# Patient Record
Sex: FEMALE | Race: WHITE | NOT HISPANIC OR LATINO | Employment: UNEMPLOYED | URBAN - METROPOLITAN AREA
[De-identification: names, ages, dates, MRNs, and addresses within clinical notes are randomized per-mention and may not be internally consistent; named-entity substitution may affect disease eponyms.]

---

## 2018-10-21 ENCOUNTER — HOSPITAL ENCOUNTER (EMERGENCY)
Facility: HOSPITAL | Age: 48
Discharge: HOME | End: 2018-10-21
Attending: EMERGENCY MEDICINE
Payer: COMMERCIAL

## 2018-10-21 VITALS
HEART RATE: 100 BPM | RESPIRATION RATE: 18 BRPM | BODY MASS INDEX: 26.43 KG/M2 | SYSTOLIC BLOOD PRESSURE: 126 MMHG | HEIGHT: 61 IN | TEMPERATURE: 98.1 F | OXYGEN SATURATION: 98 % | WEIGHT: 140 LBS | DIASTOLIC BLOOD PRESSURE: 64 MMHG

## 2018-10-21 DIAGNOSIS — S90.521A: Primary | ICD-10-CM

## 2018-10-21 PROCEDURE — 99283 EMERGENCY DEPT VISIT LOW MDM: CPT

## 2018-10-21 RX ORDER — CIPROFLOXACIN 250 MG/1
250 TABLET, FILM COATED ORAL EVERY 12 HOURS
Qty: 6 TABLET | Refills: 0 | Status: SHIPPED | OUTPATIENT
Start: 2018-10-21 | End: 2018-10-24

## 2018-10-21 RX ORDER — CIPROFLOXACIN 500 MG/1
500 TABLET, FILM COATED ORAL ONCE
Status: COMPLETED | OUTPATIENT
Start: 2018-10-21 | End: 2018-10-21

## 2018-10-21 RX ADMIN — CIPROFLOXACIN HYDROCHLORIDE 500 MG: 500 TABLET, FILM COATED ORAL at 22:16

## 2018-10-22 NOTE — ED PROVIDER NOTES
History  Chief Complaint   Patient presents with    Blister     Patient states has a blister on her right ankle that was "from a boot" that she noticed on Friday  came to ER tonight because she started with a "pink streak going up the inside of my leg "  Some reddness and warmth around blister area on right heel  Also has open blister on left heel, but no pain and no streaking up leg     35YEAR-OLD FEMALE WITH NO HISTORY OF DISEASE PRESENTS TO THE EMERGENCY DEPARTMENT WITH A BLISTER TO RIGHT ANKLE FOR THE PAST 2 DAYS  THE PATIENT ATTRIBUTES THE BLISTER TO WAY OUT RUB FROM HER BOOT, A SIMILAR OCCURRENCE HAVING HAPPEN ON HER LEFT ANKLE  THE CLEANING HER ANKLE WITH FULL STRENGTH HYDROGEN PEROXIDE THE PATIENT NOTICED REDNESS IN AND AROUND THE LESION AND SOME REPORTED STREAKING PROXIMALLY  SHE DENIES ANY PAIN NO ITCHING NO DISCHARGE FROM THE WOUND SITE ITSELF  SHE REPORTS NO FEVER OR CHILLS  None       History reviewed  No pertinent past medical history  History reviewed  No pertinent surgical history  History reviewed  No pertinent family history  I have reviewed and agree with the history as documented  Social History   Substance Use Topics    Smoking status: Never Smoker    Smokeless tobacco: Never Used    Alcohol use No        Review of Systems   Constitutional: Negative for chills and fever  HENT: Negative for ear pain, rhinorrhea and sore throat  Eyes: Negative for pain, redness and visual disturbance  Respiratory: Negative for cough and shortness of breath  Cardiovascular: Negative for chest pain and leg swelling  Gastrointestinal: Negative for abdominal pain, diarrhea, nausea and vomiting  Genitourinary: Negative for dysuria, flank pain, frequency and urgency  Musculoskeletal: Negative for back pain, myalgias and neck pain  Skin: Negative for rash  Neurological: Negative for dizziness, weakness, light-headedness and headaches  Hematological: Negative  Psychiatric/Behavioral: Negative for agitation, confusion and suicidal ideas  The patient is not nervous/anxious  All other systems reviewed and are negative  Physical Exam  Physical Exam   Constitutional: She is oriented to person, place, and time  She appears well-developed and well-nourished  HENT:   Nose: Nose normal    Mouth/Throat: Oropharynx is clear and moist  No oropharyngeal exudate  Eyes: Pupils are equal, round, and reactive to light  Conjunctivae and EOM are normal  No scleral icterus  Neck: Normal range of motion  Neck supple  No JVD present  No tracheal deviation present  Cardiovascular: Normal rate, regular rhythm and normal heart sounds  No murmur heard  Pulmonary/Chest: Effort normal and breath sounds normal  No respiratory distress  She has no wheezes  She has no rales  Abdominal: Soft  Bowel sounds are normal  There is no tenderness  There is no guarding  Musculoskeletal: Normal range of motion  She exhibits no edema or tenderness  Neurological: She is alert and oriented to person, place, and time  No cranial nerve deficit or sensory deficit  She exhibits normal muscle tone  5/5 motor, nl sens   Skin: Skin is warm and dry  THERE IS AN APPROXIMATE 1 CM RUPTURED BLISTER TO THE RIGHT MEDIAL ANKLE WITH A LINEAR APPENDAGE THAT EXTENDS POSTERIOR WERE  THERE ARE NON VIABLE FLAPS THE ASPECTS OF THE BLISTER  THERE IS ASSOCIATED ERYTHEMA WITH SOME PROXIMAL ERYTHEMA BUT NO DISTINCT STREAKING  THIS PATCH OF ERYTHEMA IS UNREMARKABLE IN TERMS OF WARMTH TENDERNESS OR EDEMA  THERE IS NO CELLULITIC COMPONENT  THERE IS NO WOUND SEPSIS  SEE PROCEDURE NOTE FOR FURTHER INFORMATION  Psychiatric: She has a normal mood and affect  Her behavior is normal    Nursing note and vitals reviewed        Vital Signs  ED Triage Vitals [10/21/18 2145]   Temperature Pulse Respirations Blood Pressure SpO2   98 1 °F (36 7 °C) 100 18 126/64 98 %      Temp Source Heart Rate Source Patient Position - Orthostatic VS BP Location FiO2 (%)   Temporal Monitor Sitting Left arm --      Pain Score       No Pain           Vitals:    10/21/18 2145   BP: 126/64   Pulse: 100   Patient Position - Orthostatic VS: Sitting       Visual Acuity      ED Medications  Medications   ciprofloxacin (CIPRO) tablet 500 mg (not administered)       Diagnostic Studies  Results Reviewed     None                 No orders to display              Procedures  Lac Repair  Date/Time: 10/21/2018 10:17 PM  Performed by: Carlo Nielson  Authorized by: Rosalia SMALL   Consent: Verbal consent obtained  Body area: lower extremity  Location details: right ankle  Foreign bodies: no foreign bodies  Tendon involvement: none  Nerve involvement: none  Vascular damage: no    Sedation:  Patient sedated: no      Procedure Details:  Irrigation solution: saline  Amount of cleaning: standard  Debridement: minimal  Degree of undermining: none  Patient tolerance: Patient tolerated the procedure well with no immediate complications  Comments: THE WOUND IS DEBRIDED OF ITS NONVIABLE FLAPS  THE EXPOSED WOUND REVEALS NO DISCHARGE NOR SEPTIC COMPONENT  THERE IS EARLY GRANULATION TO MARGINS  THERE IS NO MASS ASSOCIATED TENDERNESS NO SIGNIFICANT EDEMA NOR IS THERE ERYTHEMA OR WARMTH TO SUGGEST A CELLULITIC COMPONENT  Phone Contacts  ED Phone Contact    ED Course                               MDM  CritCare Time    Disposition  Final diagnoses:   Blister of right ankle     Time reflects when diagnosis was documented in both MDM as applicable and the Disposition within this note     Time User Action Codes Description Comment    10/21/2018 10:08 PM Epifanio Gutierrez [D51 [id number]] Blister of right ankle       ED Disposition     ED Disposition Condition Comment    Discharge  Mayra Lainez discharge to home/self care      Condition at discharge: Stable        Follow-up Information     Follow up With Specialties Details Why Contact [facility]    HEALTHCARE PROVIDER OF CHOICE  Call            Patient's Medications   Discharge Prescriptions    CIPROFLOXACIN (CIPRO) 250 MG TABLET    Take 1 tablet (250 mg total) by mouth every 12 (twelve) hours for 3 days       Start Date: 10/21/2018End Date: 10/24/2018       Order Dose: 250 mg       Quantity: 6 tablet    Refills: 0     No discharge procedures on file      ED Provider  Electronically Signed by           Darryl Weiss MD  10/21/18 9755

## 2018-10-22 NOTE — DISCHARGE INSTRUCTIONS
Blister, Ambulatory Care   GENERAL INFORMATION:   A blister  is a fluid-filled pocket on the surface of your skin  A blister forms when hot or moist skin rubs or pushes against an object repeatedly  Blisters mostly occur on body parts that are used often, or move freely, such as your hands or feet  Pain can be mild or severe, depending on the size of your blister  Contact your healthcare provider for the following symptoms:   · Increased pain, redness, and swelling    · A bad-smelling fluid coming from your blister    · The blister is not healing within the amount of time your healthcare provider says it should    · Fever, chills, or body aches  Care for your blister:   · Do not pop your blister or tear the skin on it  This could cause infection and slow the healing process  · Wash your hands before you care for your blister  to help prevent infection  Use soap and water  Wash your hands often, and after you use the bathroom, change a child's diapers, or sneeze  · Clean your blister as directed  Carefully remove your bandage  If the bandage sticks to your blister, pour saline on it  This will help the bandage come off more easily and prevent further skin damage  Wash the blister area with soap or saline and water  Gently pat the area dry  · Look for signs of infection  Check the area around your blister for swelling, redness, or pain  · Apply clean bandages as directed  Change your bandages when they get wet, dirty, or soaked with fluid  Your healthcare provider may tell you to use certain moist bandages or hydrogels to prevent them from sticking to your wound  You may need a bandage that absorbs well if your blister has excess fluid  You may be told to wear a second bandage for extra protection  · Take medicine for pain as directed  Prevent another blister:   · Wear synthetic clothing    Acrylic-blend, and moisture-wicking fabrics will help prevent moisture buildup and friction on your skin  These fabrics will also prevent your blister from sticking to them  · Use lubricating ointment  Emollient or silicone ointments may prevent blisters during activities that last 1 hour or less  Do not use them for activities that will last longer than 1 hour  · Wear antiperspirant on your feet as directed  Reduced moisture on your feet will help prevent blisters  · Wear shoes that fit well  Shoes that rub your feet may cause or worsen blisters  Wear cushioned insoles as directed  Follow up with your healthcare provider as directed: You may need to return to have your blister drained if it is large  Write down your questions so you remember to ask them during your visits  CARE AGREEMENT:   You have the right to help plan your care  Learn about your health condition and how it may be treated  Discuss treatment options with your caregivers to decide what care you want to receive  You always have the right to refuse treatment  The above information is an  only  It is not intended as medical advice for individual conditions or treatments  Talk to your doctor, nurse or pharmacist before following any medical regimen to see if it is safe and effective for you  © 2014 [address] is for End User's use only and may not be sold, redistributed or otherwise used for commercial purposes  All illustrations and images included in CareNotes® are the copyrighted property of A D A M [employer]  or [employer]

## 2019-12-17 ENCOUNTER — TRANSCRIBE ORDERS (OUTPATIENT)
Dept: ADMINISTRATIVE | Facility: HOSPITAL | Age: 49
End: 2019-12-17

## 2019-12-17 ENCOUNTER — HOSPITAL ENCOUNTER (OUTPATIENT)
Dept: NON INVASIVE DIAGNOSTICS | Facility: HOSPITAL | Age: 49
Discharge: HOME | End: 2019-12-17
Payer: COMMERCIAL

## 2019-12-17 ENCOUNTER — HOSPITAL ENCOUNTER (OUTPATIENT)
Dept: RADIOLOGY | Facility: HOSPITAL | Age: 49
Discharge: HOME | End: 2019-12-17
Payer: COMMERCIAL

## 2019-12-17 DIAGNOSIS — Z01.818 PRE-OPERATIVE CLEARANCE: ICD-10-CM

## 2019-12-17 DIAGNOSIS — Z01.818 PRE-OPERATIVE CLEARANCE: Primary | ICD-10-CM

## 2019-12-17 LAB
ATRIAL RATE: 96 BPM
P AXIS: 73 DEGREES
PR INTERVAL: 140 MS
QRS AXIS: 56 DEGREES
QRSD INTERVAL: 80 MS
QT INTERVAL: 354 MS
QTC INTERVAL: 447 MS
T WAVE AXIS: 51 DEGREES
VENTRICULAR RATE: 96 BPM

## 2019-12-17 PROCEDURE — 71046 X-RAY EXAM CHEST 2 VIEWS: CPT

## 2019-12-17 PROCEDURE — 93010 ELECTROCARDIOGRAM REPORT: CPT | Performed by: INTERNAL MEDICINE

## 2019-12-17 PROCEDURE — 93005 ELECTROCARDIOGRAM TRACING: CPT

## 2021-09-17 ENCOUNTER — NURSE TRIAGE (OUTPATIENT)
Dept: OTHER | Facility: OTHER | Age: 51
End: 2021-09-17

## 2021-09-17 DIAGNOSIS — Z20.822 EXPOSURE TO COVID-19 VIRUS: Primary | ICD-10-CM

## 2021-09-17 NOTE — TELEPHONE ENCOUNTER
Reason for Disposition   [1] CLOSE CONTACT COVID-19 EXPOSURE within last 14 days AND [2] NO symptoms    Answer Assessment - Initial Assessment Questions  Were you within 6 feet or less, for up to 15 minutes or more with a person that has a confirmed COVID-19 test?   Yes     What was the date of your exposure?    9/13    Are you experiencing any symptoms attributed to the virus?  (Assess for SOB, cough, fever, difficulty breathing)   Runny nose    HIGH RISK: Do you have any history heart or lung conditions, weakened immune system, diabetes, Asthma, CHF, HIV, COPD, Chemo, renal failure, sickle cell, etc?   Denies    Protocols used: CORONAVIRUS (COVID-19) EXPOSURE-ADULT-AH

## 2021-09-18 PROCEDURE — U0003 INFECTIOUS AGENT DETECTION BY NUCLEIC ACID (DNA OR RNA); SEVERE ACUTE RESPIRATORY SYNDROME CORONAVIRUS 2 (SARS-COV-2) (CORONAVIRUS DISEASE [COVID-19]), AMPLIFIED PROBE TECHNIQUE, MAKING USE OF HIGH THROUGHPUT TECHNOLOGIES AS DESCRIBED BY CMS-2020-01-R: HCPCS | Performed by: FAMILY MEDICINE

## 2021-09-18 PROCEDURE — U0005 INFEC AGEN DETEC AMPLI PROBE: HCPCS | Performed by: FAMILY MEDICINE

## 2021-09-21 ENCOUNTER — TELEPHONE (OUTPATIENT)
Dept: OTHER | Facility: OTHER | Age: 51
End: 2021-09-21

## 2021-09-21 NOTE — TELEPHONE ENCOUNTER
Your test for the novel coronavirus, also known as COVID-19, was positive  The sample showed that the virus was present  Positive COVID-19 test results are reportable to the [place] Department of Health  You may receive a call from trained public health staff to conduct an interview  It is important to answer their call  They will ask you to verify who you are  During the call they will ask you about what symptoms you have, what you did before you got sick, and who you were close to while sick  The health department does this to make sure everyone stays healthy and to reduce the spread of the virus  If you would like to verify if the caller does in fact work in contact tracing, call the [address] [place] [address] at [employer] ([phone])  For additional information, please visit the [employer] website: www health [place] gov     If you have any additional questions, we can schedule a virtual visit for you with a provider or call the [facility] hotline [phone], option 7, for care advice    For additional information, please visit the Coronavirus FAQ on the [facility] home page ([employer]  org)  Patient has a PCP for follow up care

## 2022-03-27 ENCOUNTER — HOSPITAL ENCOUNTER (EMERGENCY)
Facility: HOSPITAL | Age: 52
Discharge: HOME | End: 2022-03-27
Attending: EMERGENCY MEDICINE
Payer: COMMERCIAL

## 2022-03-27 VITALS
HEIGHT: 61 IN | DIASTOLIC BLOOD PRESSURE: 94 MMHG | WEIGHT: 145 LBS | RESPIRATION RATE: 17 BRPM | HEART RATE: 102 BPM | BODY MASS INDEX: 27.38 KG/M2 | SYSTOLIC BLOOD PRESSURE: 141 MMHG | OXYGEN SATURATION: 99 % | TEMPERATURE: 97.6 F

## 2022-03-27 DIAGNOSIS — Z71.1 WORRIED WELL: Primary | ICD-10-CM

## 2022-03-27 PROCEDURE — 99281 EMR DPT VST MAYX REQ PHY/QHP: CPT | Performed by: PHYSICIAN ASSISTANT

## 2022-03-27 PROCEDURE — 99283 EMERGENCY DEPT VISIT LOW MDM: CPT

## 2022-03-27 NOTE — ED PROVIDER NOTES
History  Chief Complaint   Patient presents with    Medical Problem     Patient reports that she woke up this morning and noticed her vein in her forhead was bigger than normal       34-year-old female history of anxiety presents complaining of a dilated vein in her forehead  Patient reports that she had laser treatments for rosacea on her face a few days ago  This is the 1st time she has ever received those treatments  Patient reports that she was reading a lot on the Internet and is concerned that the dilated vein could potentially be a blood clot or an aneurysm  Patient denies any pain over the affected area  She denies any headache, visual changes including diplopia, jaw claudication, chest pain, nausea, sweats, shortness of breath  She denies any recent trauma  Has not taken anything for her symptoms  States that she is under lot of stress  None       History reviewed  No pertinent past medical history  History reviewed  No pertinent surgical history  History reviewed  No pertinent family history  I have reviewed and agree with the history as documented  E-Cigarette/Vaping     E-Cigarette/Vaping Substances     Social History     Tobacco Use    Smoking status: Never Smoker    Smokeless tobacco: Never Used   Substance Use Topics    Alcohol use: No    Drug use: No       Review of Systems   Constitutional: Negative for chills, fatigue and fever  HENT: Negative for ear pain and sore throat  Eyes: Negative for pain  Respiratory: Negative for cough, shortness of breath and wheezing  Cardiovascular: Negative for chest pain, palpitations and leg swelling  Gastrointestinal: Negative for abdominal pain, constipation, diarrhea, nausea and vomiting  Endocrine: Negative for polyuria  Genitourinary: Negative for dysuria and pelvic pain  Musculoskeletal: Negative for arthralgias, myalgias, neck pain and neck stiffness  Skin: Negative for rash     Neurological: Negative for dizziness, syncope, light-headedness and headaches  Psychiatric/Behavioral: The patient is nervous/anxious  All other systems reviewed and are negative  Physical Exam  Physical Exam  Constitutional:       General: She is not in acute distress  Appearance: Normal appearance  She is well-developed  HENT:      Head: Normocephalic and atraumatic  Comments: No temporal tenderness  There are 2 dilated veins over patient's temporal region  No overlying skin changes  No erythema, edema  Right Ear: External ear normal       Left Ear: External ear normal       Mouth/Throat:      Pharynx: No oropharyngeal exudate  Eyes:      Extraocular Movements: Extraocular movements intact  Pupils: Pupils are equal, round, and reactive to light  Cardiovascular:      Rate and Rhythm: Normal rate and regular rhythm  Heart sounds: Normal heart sounds  Pulmonary:      Effort: Pulmonary effort is normal       Breath sounds: Normal breath sounds  Abdominal:      General: Bowel sounds are normal       Palpations: Abdomen is soft  Tenderness: There is no abdominal tenderness  Musculoskeletal:         General: Normal range of motion  Cervical back: Normal range of motion  Skin:     General: Skin is warm  Capillary Refill: Capillary refill takes less than 2 seconds  Neurological:      General: No focal deficit present  Mental Status: She is alert and oriented to person, place, and time  Cranial Nerves: No cranial nerve deficit     Psychiatric:      Comments: Anxious, rapid and pressured speech         Vital Signs  ED Triage Vitals [03/27/22 1012]   Temperature Pulse Respirations Blood Pressure SpO2   97 6 °F (36 4 °C) 102 17 141/94 99 %      Temp Source Heart Rate Source Patient Position - Orthostatic VS BP Location FiO2 (%)   Oral Monitor Sitting Left arm --      Pain Score       No Pain           Vitals:    03/27/22 1012   BP: 141/94   Pulse: 102   Patient Position - Orthostatic VS: Sitting         Visual Acuity      ED Medications  Medications - No data to display    Diagnostic Studies  Results Reviewed     None                 No orders to display              Procedures  Procedures         ED Course                               SBIRT 22yo+      Most Recent Value   SBIRT (22 yo +)    In order to provide better care to our patients, we are screening all of our patients for alcohol and drug use  Would it be okay to ask you these screening questions? Yes Filed at: 03/27/2022 1015   Initial Alcohol Screen: US AUDIT-C     1  How often do you have a drink containing alcohol? 0 Filed at: 03/27/2022 1015   2  How many drinks containing alcohol do you have on a typical day you are drinking? 0 Filed at: 03/27/2022 1015   3a  Male UNDER 65: How often do you have five or more drinks on one occasion? 0 Filed at: 03/27/2022 1015   3b  FEMALE Any Age, or MALE 65+: How often do you have 4 or more drinks on one occassion? 0 Filed at: 03/27/2022 1015   Audit-C Score 0 Filed at: 03/27/2022 1015   RENE: How many times in the past year have you    Used an illegal drug or used a prescription medication for non-medical reasons? Never Filed at: 03/27/2022 1015                    MDM  Number of Diagnoses or Management Options  Worried well  Diagnosis management comments: Patient presented with concerns of a dilated vein over her left temporal region  Patient recently had laser treatment on her face for rosacea  Suspect this is likely due to her recent laser treatment  Recommend warm compresses  No overlying skin changes  No tenderness  Doubt superficial thrombophlebitis but patient informed that she may take NSAIDs if she develops any pain  Recommend patient call the doctor that performed the laser treatments  Patient has no signs or symptoms of temporal arteritis  No headache, patient is completely neurologically intact  Doubt CT would     Bedside ultrasound demonstrates that veins are collapsible in this provided patient with significant reassurance  Return precautions were advised, all questions were answered and patient was agreeable to plan and very thankful for care  Disposition  Final diagnoses:   Worried well     Time reflects when diagnosis was documented in both MDM as applicable and the Disposition within this note     Time User Action Codes Description Comment    3/27/2022 10:33 AM José Alcantar Add [Z71 1] Worried well       ED Disposition     ED Disposition Condition Date/Time Comment    Discharge Stable Sun Mar 27, 2022 10:33 AM Jacinta Blood discharge to home/self care  Follow-up Information    None         There are no discharge medications for this patient  No discharge procedures on file      PDMP Review     None          ED Provider  Electronically Signed by           Julio Martinez PA-C  03/27/22 3694

## 2022-03-27 NOTE — DISCHARGE INSTRUCTIONS
Apply a warm compress at least once daily  Discuss with the doctor that performed your laser treatments

## 2022-07-23 ENCOUNTER — AMB VIDEO VISIT (OUTPATIENT)
Dept: OTHER | Facility: HOSPITAL | Age: 52
End: 2022-07-23

## 2022-07-23 VITALS — OXYGEN SATURATION: 100 % | HEART RATE: 97 BPM | RESPIRATION RATE: 16 BRPM

## 2022-07-23 DIAGNOSIS — J20.9 ACUTE BRONCHITIS, UNSPECIFIED ORGANISM: Primary | ICD-10-CM

## 2022-07-23 PROCEDURE — ECARE PR SL URGENT CARE VIRTUAL VISIT: Performed by: NURSE PRACTITIONER

## 2022-07-23 RX ORDER — ATORVASTATIN CALCIUM 20 MG/1
20 TABLET, FILM COATED ORAL DAILY
COMMUNITY

## 2022-07-23 RX ORDER — AZITHROMYCIN 250 MG/1
TABLET, FILM COATED ORAL
Qty: 6 TABLET | Refills: 0 | Status: SHIPPED | OUTPATIENT
Start: 2022-07-23 | End: 2022-07-27

## 2022-07-23 RX ORDER — BENZONATATE 100 MG/1
100 CAPSULE ORAL 3 TIMES DAILY PRN
Qty: 9 CAPSULE | Refills: 0 | Status: SHIPPED | OUTPATIENT
Start: 2022-07-23 | End: 2022-07-26

## 2022-07-23 NOTE — CARE ANYWHERE EVISITS
Visit Summary for [facility]   Alfredo Menendez - Gender: Female - Date of Birth: [id number]  Date: [id number] - Duration: 9 minutes  Patient: [facility]   Alfredo Menendez  Provider: Nicoel ARCOS    Patient Contact Information  Address  [address] [place], [place] [address] [address]  [phone]    Visit Topics  Cold [Added By: Self - 2022-07-23]    Triage Questions   What is your current physical address in the event of a medical emergency? Answer []  Are you allergic to any medications? Answer []  Are you now or could you be pregnant? Answer []  Do you have any immune system compromise or chronic lung   disease? Answer []  Do you have any vulnerable family members in the home (infant, pregnant, cancer, elderly)? Answer []     Conversation Transcripts  [0A][0A] [Notification] You are connected with Jie Patel, Urgent Care Specialist [0A][Notification] Barbara Guthrie is located in [place]  [0A][Notification] Barbara Guthrie has shared health history  [place] Loi  [0A]    Diagnosis  Acute bronchitis, unspecified    Procedures  Value: [id number] Code: CPT-4 UNLISTED E&M SERVICE    Medications Prescribed    No prescriptions ordered    Electronically signed by: Jie Anton(NPI [id number])

## 2022-07-23 NOTE — PROGRESS NOTES
Video Visit - Yanira Reveles 45 y o  female MRN: [id number]    REQUIRED DOCUMENTATION:         1  This service was provided via [facility]  2  Provider located at [address] [place] [id number]  3  [facility] provider: JOSSELYN Messina  4  Identify all parties in room with patient during [facility] visit:  patient   5  After connecting through [place], patient was identified by name and date of birth  Patient was then informed that this was a Telemedicine visit and that the exam was being conducted confidentially over secure lines  My office door was closed  No one else was in the room  Patient acknowledged consent and understanding of privacy and security of the Telemedicine visit  I informed the patient that I have reviewed their record in Epic and presented the opportunity for them to ask any questions regarding the visit today  The patient agreed to participate  45year old female here today for video visit  She states she has not been feeling well for about 1 week  Returned for vacation last Sunday  Cough, sore throat, loss of voice  No chest pain or sob  No diarrhea  Slight yellow mucous production with cough  No sinus pressure  She is vaccinated for covid  Covid test negative  Primary complaint is loss of voice  Review of Systems   Constitutional: Negative for activity change, chills and fatigue  HENT: Positive for congestion  Negative for rhinorrhea, sinus pressure, sinus pain and sore throat (loss of voice )  Respiratory: Positive for cough  Negative for chest tightness, shortness of breath and wheezing  Cardiovascular: Negative  Neurological: Negative  Psychiatric/Behavioral: Negative  Vitals:    07/23/22 0901   Pulse: 97   Resp: 16   SpO2: 100%     Physical Exam  Constitutional:       General: She is not in acute distress  Appearance: Normal appearance  She is not ill-appearing or toxic-appearing     HENT:      Head: Normocephalic and atraumatic  Nose: No congestion  Mouth/Throat:      Comments: Posterior pharynx difficult to see, appears clear with no exudate or tonsilar enlargement   Eyes:      Conjunctiva/sclera: Conjunctivae normal    Pulmonary:      Effort: Pulmonary effort is normal  No respiratory distress  Comments: No cough or audible wheezing   Able to speak in full sentences   Neurological:      Mental Status: She is alert and oriented to person, place, and time  Psychiatric:         Mood and Affect: Mood normal          Behavior: Behavior normal          Thought Content: Thought content normal        Diagnoses and all orders for this visit:    Acute bronchitis, unspecified organism  -     azithromycin (ZITHROMAX) 250 mg tablet; 2 tablets on day 1, 1 tablet day 2-5  -     benzonatate (TESSALON PERLES) 100 mg capsule; Take 1 capsule (100 mg total) by mouth 3 (three) times a day as needed for cough for up to 3 days      Patient Instructions   As we discussed, this is most likely viral   Offered prednisone but you declined  Cough medication as needed  As we discussed only start antibiotic if no improvement in 3 days or worsening symptoms  Salt water gargles can be helpful  Warm tea with honey  Tylenol or motrin as needed  Follow up with PCP if no improvement  Go to ER with any worsening symptoms  Follow up with PCP if not improved, if symptoms are worse, go to the ER

## 2022-07-23 NOTE — PATIENT INSTRUCTIONS
As we discussed, this is most likely viral   Offered prednisone but you declined  Cough medication as needed  As we discussed only start antibiotic if no improvement in 3 days or worsening symptoms  Salt water gargles can be helpful  Warm tea with honey  Tylenol or motrin as needed  Follow up with PCP if no improvement  Go to ER with any worsening symptoms

## 2024-02-10 ENCOUNTER — AMB VIDEO VISIT (OUTPATIENT)
Dept: OTHER | Facility: HOSPITAL | Age: 54
End: 2024-02-10

## 2024-02-10 VITALS
WEIGHT: 145 LBS | TEMPERATURE: 99.6 F | HEIGHT: 61 IN | BODY MASS INDEX: 27.38 KG/M2 | HEART RATE: 48 BPM | RESPIRATION RATE: 14 BRPM

## 2024-02-10 DIAGNOSIS — U07.1 COVID: Primary | ICD-10-CM

## 2024-02-10 PROBLEM — E78.49 OTHER HYPERLIPIDEMIA: Status: ACTIVE | Noted: 2024-02-10

## 2024-02-10 PROCEDURE — ECARE PR SL URGENT CARE VIRTUAL VISIT: Performed by: PHYSICIAN ASSISTANT

## 2024-02-10 RX ORDER — BENZONATATE 200 MG/1
200 CAPSULE ORAL 3 TIMES DAILY PRN
Qty: 20 CAPSULE | Refills: 0 | Status: SHIPPED | OUTPATIENT
Start: 2024-02-10

## 2024-02-10 RX ORDER — MULTIVITAMIN
1 CAPSULE ORAL DAILY
COMMUNITY

## 2024-02-10 NOTE — PATIENT INSTRUCTIONS
"[facility] Phone number is [phone] if you need assistance or have further questions  note in \"Letters\" section of [employer] [facility]. Can print if opened from a web browser    Find an outpatient lab:  [email or web address]  Call [phone], option 2 to request a mobile lab visit to your home    Please Be Courteous:  You have COVID-19. Day 0 is your first day of symptoms. Day 1 is the first full day after your symptoms started. You should isolate yourself away from other through day 5 since this is when you are likely most infectious. This means go home and stay home.     In Your Home:  If you live with other people, trying to avoid common spaces and disinfect areas that you come into contact with.  Per the CDC's recommendations: Use a separate bedroom and bathroom if feasible. If If other people in your home are concerned they may have covid, isolation should begin even before seeking testing and before test results become available. All household members should start wearing a mask in the home, particularly in shared spaces where appropriate distancing is not possible.     Take Care of Yourself:  Schedule a virtual visit with your primary care physician as soon as possible. Try to sleep on your stomach as much as possible.  If you have the ability to, take vitamin D3 2000 IU daily, vitamin-C 1000 mg twice a day, a multivitamin daily to help boost your immune system.  You should check your temperature twice day. Go to the emergency department for any severe shortness of breath, chest pain or pulse ox less than 90%.    Isolation: Everyone, regardless of vaccination status:    You may end isolation after day 5 if:  You are fever-free for 24 hours (without the use of fever-reducing medication)    Your symptoms are improving    If you still have fever or your other symptoms have not improved, continue to isolate until they improve.    If you had?moderate illness?(if you experienced shortness of " breath or had difficulty breathing), or?severe illness?(you were hospitalized) due to COVID-19, or you have a weakened immune system, you need to isolate through day 10.    If you had?severe illness?or have a weakened immune system, consult your doctor before ending isolation. Ending isolation without a viral test may not be an option for you.  https://www.cdc.gov/coronavirus/2019-ncov/your-health/isolation.html

## 2024-02-10 NOTE — PROGRESS NOTES
Required Documentation:  Encounter provider Shannon D Severino, PA-C    Provider located at [facility]  VIRTUAL CARE   [address] [id number]    Identify all parties in room with patient during virtual visit:  No one else    The patient was identified by name and date of birth. Tiffanie Longoria was informed that this is a telemedicine visit and that the visit is being conducted through the [employer] platform. She agrees to proceed..  My office door was closed. No one else was in the room.  She acknowledged consent and understanding of privacy and security of the video platform. The patient has agreed to participate and understands they can discontinue the visit at any time.    Verification of patient location:    Patient is located at Home in the following state in which I hold an active license [place]    Patient is aware this is a billable service.     Reason for visit is No chief complaint on file.       Subjective  HPI   Pt reports tested positive for COVID today. Has sx sore throat, congestion, slight fever, slight cough, HA last night. Nothing taken for sx. Reports she is leaving to go to [place] on Thursday. Denies CP or SOB. Vaccinated for COVID. Had COVID not requiring hospitalization. Reports only takes atorvastatin 3 times weekly. Last dose on Wednesday.     No past medical history on file.    No past surgical history on file.     Allergies   Allergen Reactions    Penicillins Hives       Review of Systems   Constitutional:  Positive for fever.   HENT:  Positive for congestion and sore throat. Negative for nosebleeds.    Eyes:  Negative for redness.   Respiratory:  Negative for shortness of breath.    Cardiovascular:  Negative for chest pain.   Gastrointestinal:  Negative for blood in stool, diarrhea, nausea and vomiting.   Genitourinary:  Negative for hematuria.   Musculoskeletal:  Negative for gait problem.   Skin:  Negative for rash.  "  Neurological:  Positive for headaches. Negative for seizures.   Psychiatric/Behavioral:  Negative for behavioral problems.        Video Exam    Vitals:    02/10/24 0827   Pulse: (!) 48   Resp: 14   Temp: 99.6 °F (37.6 °C)   Weight: 65.8 kg (145 lb)   Height: 5' 1\" (1.549 m)       Physical Exam  Constitutional:       General: She is not in acute distress.     Appearance: Normal appearance. She is ill-appearing (mild). She is not toxic-appearing.   HENT:      Head: Normocephalic and atraumatic.      Nose: No rhinorrhea.      Mouth/Throat:      Mouth: Mucous membranes are moist.   Eyes:      Conjunctiva/sclera: Conjunctivae normal.   Cardiovascular:      Rate and Rhythm: Bradycardia present.   Pulmonary:      Effort: Pulmonary effort is normal. No respiratory distress.      Breath sounds: No wheezing (no gross audible wheeze through computer).   Musculoskeletal:      Cervical back: Normal range of motion.      Comments: Ambulatory about home without difficulty   Skin:     Findings: No rash (on face or neck).   Neurological:      Mental Status: She is alert.      Cranial Nerves: No dysarthria or facial asymmetry.   Psychiatric:         Mood and Affect: Mood normal.         Behavior: Behavior normal.         Visit Time  Total Visit Duration: 18 minutes    Assessment/Plan:    Diagnoses and all orders for this visit:    COVID  -     Basic metabolic panel; Future  -     benzonatate (TESSALON) 200 MG capsule; Take 1 capsule (200 mg total) by mouth 3 (three) times a day as needed for cough        Patient Instructions   [facility] Phone number is [phone] if you need assistance or have further questions  note in \"Letters\" section of [facility]. Can print if opened from a web browser    Find an outpatient lab:  [email or web address]  Call [phone], option 2 to request a mobile lab visit to your home    Please Be Courteous:  You have COVID-19. Day 0 is your first day of symptoms. Day 1 is the " first full day after your symptoms started. You should isolate yourself away from other through day 5 since this is when you are likely most infectious. This means go home and stay home.     In Your Home:  If you live with other people, trying to avoid common spaces and disinfect areas that you come into contact with.  Per the CDC's recommendations: Use a separate bedroom and bathroom if feasible. If If other people in your home are concerned they may have covid, isolation should begin even before seeking testing and before test results become available. All household members should start wearing a mask in the home, particularly in shared spaces where appropriate distancing is not possible.     Take Care of Yourself:  Schedule a virtual visit with your primary care physician as soon as possible. Try to sleep on your stomach as much as possible.  If you have the ability to, take vitamin D3 2000 IU daily, vitamin-C 1000 mg twice a day, a multivitamin daily to help boost your immune system.  You should check your temperature twice day. Go to the emergency department for any severe shortness of breath, chest pain or pulse ox less than 90%.    Isolation: Everyone, regardless of vaccination status:    You may end isolation after day 5 if:  You are fever-free for 24 hours (without the use of fever-reducing medication)    Your symptoms are improving    If you still have fever or your other symptoms have not improved, continue to isolate until they improve.    If you had?moderate illness?(if you experienced shortness of breath or had difficulty breathing), or?severe illness?(you were hospitalized) due to COVID-19, or you have a weakened immune system, you need to isolate through day 10.    If you had?severe illness?or have a weakened immune system, consult your doctor before ending isolation. Ending isolation without a viral test may not be an option for  you.  https://www.cdc.gov/coronavirus/2019-ncov/your-health/isolation.html

## 2024-02-10 NOTE — [facility] EVISITS
Visit Summary for DYLON MADRIGAL - Gender: Female - Date of Birth: 1970  Date: [id number] - Duration: 17 minutes  Patient: DYLON MADRIGAL  Provider: Shannon Severino PA-C    Patient Contact Information  Address  [address]; [place] [address]  [phone]    Visit Topics    Triage Questions   What is your current physical address in the event of a medical emergency? Answer []  Are you allergic to any medications? Answer []  Are you now or could you be pregnant? Answer []  Do you have any immune system compromise or chronic lung   disease? Answer []  Do you have any vulnerable family members in the home (infant, pregnant, cancer, elderly)? Answer []     Conversation Transcripts  [0A][0A] [Notification] You are connected with Shannon Severino PA-C, Urgent Care Specialist.[0A][Notification] DYLON MADRIGAL is located in [place].[0A][Notification] DYLON MADRIGAL has shared health history...[0A]    Diagnosis  COVID-19    Procedures  Value: 37100 Code: CPT-4 UNLISTED E&M SERVICE    Medications Prescribed    No prescriptions ordered    Electronically signed by: Severino PA-C, Shannon(NPI [id number])

## 2024-09-17 ENCOUNTER — OFFICE VISIT (OUTPATIENT)
Dept: URGENT CARE | Facility: CLINIC | Age: 54
End: 2024-09-17
Payer: COMMERCIAL

## 2024-09-17 VITALS
SYSTOLIC BLOOD PRESSURE: 146 MMHG | OXYGEN SATURATION: 99 % | DIASTOLIC BLOOD PRESSURE: 88 MMHG | WEIGHT: 151 LBS | TEMPERATURE: 98 F | BODY MASS INDEX: 28.53 KG/M2 | RESPIRATION RATE: 20 BRPM | HEART RATE: 110 BPM

## 2024-09-17 DIAGNOSIS — L24.3 IRRITANT CONTACT DERMATITIS DUE TO COSMETICS: Primary | ICD-10-CM

## 2024-09-17 DIAGNOSIS — R21 RASH: ICD-10-CM

## 2024-09-17 PROCEDURE — 99284 EMERGENCY DEPT VISIT MOD MDM: CPT | Performed by: NURSE PRACTITIONER

## 2024-09-17 PROCEDURE — G0383 LEV 4 HOSP TYPE B ED VISIT: HCPCS | Performed by: NURSE PRACTITIONER

## 2024-09-17 RX ORDER — PREDNISONE 10 MG/1
TABLET ORAL
Qty: 24 TABLET | Refills: 0 | Status: SHIPPED | OUTPATIENT
Start: 2024-09-17

## 2024-09-17 NOTE — PROGRESS NOTES
[facility]        NAME: Tiffanie Longoria is a 53 y.o. female  : 1970    MRN: [id number]  DATE: 2024  TIME: 12:43 PM    Assessment and Plan   Irritant contact dermatitis due to cosmetics [L24.3]  1. Irritant contact dermatitis due to cosmetics  predniSONE 10 mg tablet      2. Rash  predniSONE 10 mg tablet            Patient Instructions       Follow up with PCP in 3-5 days.  Proceed to  ER if symptoms worsen.    If tests have been performed at [facility], our office will contact you with results if changes need to be made to the care plan discussed with you at the visit.  [facility]    You have been prescribed prednisone. Take with food. Do NOT take any NSAID products (motrin, ibuprofen, aleve, advil) while taking this medication.  You may take tylenol.   You may apply benadryl cream to the arms and take claritin/allegra/or zyrtect (pick one).   You are to avoid the cream you had placed on your arms in the future.    Follow up with your PCP in 3-5 days  Go to the ED if symptoms worsen         Chief Complaint     Chief Complaint   Patient presents with    Rash     Rash dionte arms x 2 days , tries benadryl but it made her to drowsy          History of Present Illness       This is a 53 year old female who states that she has had a rash bon both arms x 2 days. She states that she had used a cream with retinol in it and thinks she may have had the reaction to that. She states she was taking benadryl but was making her too sleepy. She states that she was in the weeds but this does not appear to be poison. She states it is itchy.  PMH is listed.     Rash        Review of Systems   Review of Systems   Constitutional: Negative.    HENT: Negative.     Eyes: Negative.    Respiratory: Negative.     Cardiovascular: Negative.    Gastrointestinal: Negative.    Endocrine: Negative.    Genitourinary: Negative.    Musculoskeletal: Negative.    Skin:  Positive for  rash.   Allergic/Immunologic: Negative.    Neurological: Negative.    Hematological: Negative.    Psychiatric/Behavioral: Negative.           Current Medications       Current Outpatient Medications:     atorvastatin (LIPITOR) 20 mg tablet, Take 20 mg by mouth daily, Disp: , Rfl:     Cholecalciferol 50 MCG (2000 UT) CAPS, Take 1 capsule by mouth daily, Disp: , Rfl:     Multiple Vitamin (multivitamin) capsule, Take 1 capsule by mouth daily, Disp: , Rfl:     predniSONE 10 mg tablet, Take 5 tabs po x 2 days; 4 tabs po x 2 days; 3 tabs po x 1 day; 2 tabs po x 1 day. 1 tab po x 1 day., Disp: 24 tablet, Rfl: 0    benzonatate (TESSALON) 200 MG capsule, Take 1 capsule (200 mg total) by mouth 3 (three) times a day as needed for cough, Disp: 20 capsule, Rfl: 0    Current Allergies     Allergies as of 09/17/2024 - Reviewed 09/17/2024   Allergen Reaction Noted    Penicillins Hives 10/21/2018            The following portions of the patient's history were reviewed and updated as appropriate: allergies, current medications, past family history, past medical history, past social history, past surgical history and problem list.     History reviewed. No pertinent past medical history.    History reviewed. No pertinent surgical history.    History reviewed. No pertinent family history.      Medications have been verified.        Objective   /88   Pulse (!) 110   Temp 98 °F (36.7 °C)   Resp 20   Wt 68.5 kg (151 lb)   LMP 09/28/2018   SpO2 99%   BMI 28.53 kg/m²   Patient's last menstrual period was 09/28/2018.       Physical Exam     Physical Exam  Vitals and nursing note reviewed.   Constitutional:       General: She is not in acute distress.     Appearance: Normal appearance. She is normal weight. She is not ill-appearing, toxic-appearing or diaphoretic.   HENT:      Head: Normocephalic and atraumatic.   Eyes:      Extraocular Movements: Extraocular movements intact.   Cardiovascular:      Rate and Rhythm: Normal rate.       Pulses: Normal pulses.   Pulmonary:      Effort: Pulmonary effort is normal.   Musculoskeletal:         General: Normal range of motion.      Cervical back: Normal range of motion.   Skin:     General: Skin is warm and dry.      Capillary Refill: Capillary refill takes less than 2 seconds.      Findings: Rash present.      Comments: Fine pimple papules on b/l upper arms. No drainage and no vesicles.    Neurological:      General: No focal deficit present.      Mental Status: She is alert.   Psychiatric:         Mood and Affect: Mood normal.         Behavior: Behavior normal.         Thought Content: Thought content normal.         Judgment: Judgment normal.

## 2024-09-17 NOTE — PATIENT INSTRUCTIONS
You have been prescribed prednisone. Take with food. Do NOT take any NSAID products (motrin, ibuprofen, aleve, advil) while taking this medication.  You may take tylenol.   You may apply benadryl cream to the arms and take claritin/allegra/or zyrtect (pick one).   You are to avoid the cream you had placed on your arms in the future.    Follow up with your PCP in 3-5 days  Go to the ED if symptoms worsen